# Patient Record
Sex: MALE | Race: WHITE
[De-identification: names, ages, dates, MRNs, and addresses within clinical notes are randomized per-mention and may not be internally consistent; named-entity substitution may affect disease eponyms.]

---

## 2017-11-21 ENCOUNTER — HOSPITAL ENCOUNTER (OUTPATIENT)
Dept: HOSPITAL 47 - EC | Age: 57
Setting detail: OBSERVATION
LOS: 1 days | Discharge: HOME | End: 2017-11-22
Payer: COMMERCIAL

## 2017-11-21 DIAGNOSIS — K92.2: Primary | ICD-10-CM

## 2017-11-21 DIAGNOSIS — R55: ICD-10-CM

## 2017-11-21 DIAGNOSIS — G35: ICD-10-CM

## 2017-11-21 DIAGNOSIS — R53.1: ICD-10-CM

## 2017-11-21 DIAGNOSIS — Z99.3: ICD-10-CM

## 2017-11-21 LAB
ALP SERPL-CCNC: 40 U/L (ref 38–126)
ALT SERPL-CCNC: 29 U/L (ref 21–72)
ANION GAP SERPL CALC-SCNC: 8 MMOL/L
APTT BLD: 25.5 SEC (ref 22–30)
AST SERPL-CCNC: 16 U/L (ref 17–59)
BASOPHILS # BLD AUTO: 0.1 K/UL (ref 0–0.2)
BASOPHILS NFR BLD AUTO: 1 %
BUN SERPL-SCNC: 14 MG/DL (ref 9–20)
CALCIUM SPEC-MCNC: 9.4 MG/DL (ref 8.4–10.2)
CH: 30
CHCM: 33.7
CHLORIDE SERPL-SCNC: 101 MMOL/L (ref 98–107)
CK SERPL-CCNC: 56 U/L (ref 55–170)
CO2 SERPL-SCNC: 28 MMOL/L (ref 22–30)
EOSINOPHIL # BLD AUTO: 0.1 K/UL (ref 0–0.7)
EOSINOPHIL NFR BLD AUTO: 1 %
ERYTHROCYTE [DISTWIDTH] IN BLOOD BY AUTOMATED COUNT: 5.23 M/UL (ref 4.3–5.9)
ERYTHROCYTE [DISTWIDTH] IN BLOOD: 14 % (ref 11.5–15.5)
GLUCOSE BLD-MCNC: 101 MG/DL (ref 75–99)
GLUCOSE SERPL-MCNC: 109 MG/DL (ref 74–99)
HCT VFR BLD AUTO: 46.8 % (ref 39–53)
HDW: 2.44
HGB BLD-MCNC: 15.6 GM/DL (ref 13–17.5)
INR PPP: 1.1 (ref ?–1.2)
LUC NFR BLD AUTO: 1 %
LYMPHOCYTES # SPEC AUTO: 1.6 K/UL (ref 1–4.8)
LYMPHOCYTES NFR SPEC AUTO: 17 %
MCH RBC QN AUTO: 29.8 PG (ref 25–35)
MCHC RBC AUTO-ENTMCNC: 33.3 G/DL (ref 31–37)
MCV RBC AUTO: 89.5 FL (ref 80–100)
MONOCYTES # BLD AUTO: 0.7 K/UL (ref 0–1)
MONOCYTES NFR BLD AUTO: 7 %
NEUTROPHILS # BLD AUTO: 7.2 K/UL (ref 1.3–7.7)
NEUTROPHILS NFR BLD AUTO: 73 %
NON-AFRICAN AMERICAN GFR(MDRD): >60
POTASSIUM SERPL-SCNC: 4.7 MMOL/L (ref 3.5–5.1)
PROT SERPL-MCNC: 6.6 G/DL (ref 6.3–8.2)
PT BLD: 11.1 SEC (ref 9–12)
SODIUM SERPL-SCNC: 137 MMOL/L (ref 137–145)
WBC # BLD AUTO: 0.13 10*3/UL
WBC # BLD AUTO: 9.8 K/UL (ref 3.8–10.6)
WBC (PEROX): 9.71

## 2017-11-21 PROCEDURE — 82550 ASSAY OF CK (CPK): CPT

## 2017-11-21 PROCEDURE — 99285 EMERGENCY DEPT VISIT HI MDM: CPT

## 2017-11-21 PROCEDURE — 85610 PROTHROMBIN TIME: CPT

## 2017-11-21 PROCEDURE — 85730 THROMBOPLASTIN TIME PARTIAL: CPT

## 2017-11-21 PROCEDURE — 80053 COMPREHEN METABOLIC PANEL: CPT

## 2017-11-21 PROCEDURE — 83690 ASSAY OF LIPASE: CPT

## 2017-11-21 PROCEDURE — 93005 ELECTROCARDIOGRAM TRACING: CPT

## 2017-11-21 PROCEDURE — 85025 COMPLETE CBC W/AUTO DIFF WBC: CPT

## 2017-11-21 PROCEDURE — 83036 HEMOGLOBIN GLYCOSYLATED A1C: CPT

## 2017-11-21 PROCEDURE — 84484 ASSAY OF TROPONIN QUANT: CPT

## 2017-11-21 PROCEDURE — 36415 COLL VENOUS BLD VENIPUNCTURE: CPT

## 2017-11-21 PROCEDURE — 82553 CREATINE MB FRACTION: CPT

## 2017-11-21 RX ADMIN — PANTOPRAZOLE SODIUM SCH: 40 INJECTION, POWDER, FOR SOLUTION INTRAVENOUS at 16:29

## 2017-11-21 RX ADMIN — HYDROCORTISONE SCH APPLIC: 25 CREAM TOPICAL at 23:56

## 2017-11-21 NOTE — ED
Medical Decision Making





- Medical Decision Making





57-year-old male presented for rectal bleeding.  This appears to be hemorrhoid 

bleeding families concern amount of bleeding is having and his inability to 

take care of himself secondary to MS.  Patient will be admitted for further 

evaluation.





- Lab Data


Result diagrams: 


 11/21/17 10:30





 11/21/17 10:30





 Lab Results











  11/21/17 11/21/17 11/21/17 Range/Units





  10:22 10:30 10:30 


 


WBC    9.8  (3.8-10.6)  k/uL


 


RBC    5.23  (4.30-5.90)  m/uL


 


Hgb    15.6  (13.0-17.5)  gm/dL


 


Hct    46.8  (39.0-53.0)  %


 


MCV    89.5  (80.0-100.0)  fL


 


MCH    29.8  (25.0-35.0)  pg


 


MCHC    33.3  (31.0-37.0)  g/dL


 


RDW    14.0  (11.5-15.5)  %


 


Plt Count    256  (150-450)  k/uL


 


Neutrophils %    73  %


 


Lymphocytes %    17  %


 


Monocytes %    7  %


 


Eosinophils %    1  %


 


Basophils %    1  %


 


Neutrophils #    7.2  (1.3-7.7)  k/uL


 


Lymphocytes #    1.6  (1.0-4.8)  k/uL


 


Monocytes #    0.7  (0-1.0)  k/uL


 


Eosinophils #    0.1  (0-0.7)  k/uL


 


Basophils #    0.1  (0-0.2)  k/uL


 


PT     (9.0-12.0)  sec


 


INR     (<1.2)  


 


APTT     (22.0-30.0)  sec


 


Sodium     (137-145)  mmol/L


 


Potassium     (3.5-5.1)  mmol/L


 


Chloride     ()  mmol/L


 


Carbon Dioxide     (22-30)  mmol/L


 


Anion Gap     mmol/L


 


BUN     (9-20)  mg/dL


 


Creatinine     (0.66-1.25)  mg/dL


 


Est GFR (MDRD) Af Amer     (>60 ml/min/1.73 sqM)  


 


Est GFR (MDRD) Non-Af     (>60 ml/min/1.73 sqM)  


 


Glucose     (74-99)  mg/dL


 


POC Glucose (mg/dL)  101 H    (75-99)  mg/dL


 


POC Glu Operater ID  Swapnil Martinez    


 


Calcium     (8.4-10.2)  mg/dL


 


Total Bilirubin     (0.2-1.3)  mg/dL


 


AST     (17-59)  U/L


 


ALT     (21-72)  U/L


 


Alkaline Phosphatase     ()  U/L


 


Total Creatine Kinase   56   ()  U/L


 


CK-MB (CK-2)   1.3   (0.0-2.4)  ng/mL


 


CK-MB (CK-2) Rel Index   2.3   


 


Troponin I     (0.000-0.034)  ng/mL


 


Total Protein     (6.3-8.2)  g/dL


 


Albumin     (3.5-5.0)  g/dL


 


Lipase     ()  U/L














  11/21/17 11/21/17 11/21/17 Range/Units





  10:30 10:30 10:30 


 


WBC     (3.8-10.6)  k/uL


 


RBC     (4.30-5.90)  m/uL


 


Hgb     (13.0-17.5)  gm/dL


 


Hct     (39.0-53.0)  %


 


MCV     (80.0-100.0)  fL


 


MCH     (25.0-35.0)  pg


 


MCHC     (31.0-37.0)  g/dL


 


RDW     (11.5-15.5)  %


 


Plt Count     (150-450)  k/uL


 


Neutrophils %     %


 


Lymphocytes %     %


 


Monocytes %     %


 


Eosinophils %     %


 


Basophils %     %


 


Neutrophils #     (1.3-7.7)  k/uL


 


Lymphocytes #     (1.0-4.8)  k/uL


 


Monocytes #     (0-1.0)  k/uL


 


Eosinophils #     (0-0.7)  k/uL


 


Basophils #     (0-0.2)  k/uL


 


PT   11.1   (9.0-12.0)  sec


 


INR   1.1   (<1.2)  


 


APTT   25.5   (22.0-30.0)  sec


 


Sodium  137    (137-145)  mmol/L


 


Potassium  4.7    (3.5-5.1)  mmol/L


 


Chloride  101    ()  mmol/L


 


Carbon Dioxide  28    (22-30)  mmol/L


 


Anion Gap  8    mmol/L


 


BUN  14    (9-20)  mg/dL


 


Creatinine  0.90    (0.66-1.25)  mg/dL


 


Est GFR (MDRD) Af Amer  >60    (>60 ml/min/1.73 sqM)  


 


Est GFR (MDRD) Non-Af  >60    (>60 ml/min/1.73 sqM)  


 


Glucose  109 H    (74-99)  mg/dL


 


POC Glucose (mg/dL)     (75-99)  mg/dL


 


POC Glu Operater ID     


 


Calcium  9.4    (8.4-10.2)  mg/dL


 


Total Bilirubin  0.9    (0.2-1.3)  mg/dL


 


AST  16 L    (17-59)  U/L


 


ALT  29    (21-72)  U/L


 


Alkaline Phosphatase  40    ()  U/L


 


Total Creatine Kinase     ()  U/L


 


CK-MB (CK-2)     (0.0-2.4)  ng/mL


 


CK-MB (CK-2) Rel Index     


 


Troponin I    <0.012  (0.000-0.034)  ng/mL


 


Total Protein  6.6    (6.3-8.2)  g/dL


 


Albumin  3.7    (3.5-5.0)  g/dL


 


Lipase  68    ()  U/L














Disposition


Clinical Impression: 


 Bleeding hemorrhoid, Vasovagal episode, Rectal bleeding





Disposition: ADMITTED AS IP TO THIS HOSP


Condition: Stable


Instructions:  Hemorrhoids (ED)


Additional Instructions: 


Use stool softener or fiber source to help keep stools soft.,  Use Tucks pads 

for wipes. Please return to the Emergency Department if symptoms worsen or any 

other concerns.


Prescriptions: 


Hydrocortisone [Anusol-Hc] 1 applic RECTAL BID #30 gm


Referrals: 


Matt Neville MD [Primary Care Provider] - 1-2 days

## 2017-11-21 NOTE — P.HPIM
History of Present Illness





57-year-old male was admitted to the emergency room with complaints of rectal 

bleeding.  Patient noted to have hemorrhoid.  Patient did have vasovagal 

episode with blood draw in ER.  No syncope at home.  Patient has MS legs are 

contractured patient is awaiting old chair bound.  States increasing weakness 

and contractures to arms.





Review of Systems


Constitutional: Reports weakness


Genitourinary: Reports incontinence


Musculoskeletal: Reports muscle cramps, Reports muscle weakness





Past Medical History


Additional Past Medical History / Comment(s): multiple sclerosis, hemorrhoids


History of Any Multi-Drug Resistant Organisms: None Reported


Past Surgical History: Cholecystectomy, Tonsillectomy


Past Psychological History: No Psychological Hx Reported


Smoking Status: Never smoker


Past Alcohol Use History: None Reported


Past Drug Use History: None Reported





Medications and Allergies


 Home Medications











 Medication  Instructions  Recorded  Confirmed  Type


 


Hydrocortisone [Anusol-Hc] 1 applic RECTAL BID #30 gm 11/21/17  Rx











 Allergies











Allergy/AdvReac Type Severity Reaction Status Date / Time


 


No Known Allergies Allergy   Verified 11/21/17 10:15














Physical Exam


Vitals: 


 Vital Signs











  Temp Pulse Pulse Resp BP BP Pulse Ox


 


 11/21/17 14:04  97.5 F L   109 H  16   146/87  99


 


 11/21/17 13:29   97   16  125/76   100


 


 11/21/17 12:19  98.0 F  100   20  130/71   99


 


 11/21/17 11:06   91   16  120/67   100


 


 11/21/17 10:28   71   16  120/63   98


 


 11/21/17 10:25   67   16  101/58   97


 


 11/21/17 09:43  97.5 F L  86   16  140/82   98








 Intake and Output











 11/20/17 11/21/17 11/21/17





 22:59 06:59 14:59


 


Other:   


 


  Weight   86.183 kg








 Patient Weight











 11/22/17





 06:59


 


Weight 86.183 kg














- Constitutional


General appearance: mild distress





- EENT


Eyes: PERRLA


Ears: bilateral: normal





- Neck


Neck: normal ROM





- Respiratory


Respiratory: bilateral: CTA





- Cardiovascular


Rhythm: regular





- Gastrointestinal


General gastrointestinal: soft





- Genitourinary





Almeida catheter in place





- Integumentary


Integumentary: normal





- Neurologic


Neurologic: CNII-XII intact





- Musculoskeletal





Contractures to legs wheelchair bound weakness and contractures to arms


Musculoskeletal: generalized weakness





- Psychiatric


Psychiatric: A&O x's 3, appropriate affect, intact judgment & insight





Results


CBC & Chem 7: 


 11/21/17 10:30





 11/21/17 10:30


Labs: 


 Abnormal Lab Results - Last 24 Hours (Table)











  11/21/17 11/21/17 Range/Units





  10:22 10:30 


 


Glucose   109 H  (74-99)  mg/dL


 


POC Glucose (mg/dL)  101 H   (75-99)  mg/dL


 


AST   16 L  (17-59)  U/L














Assessment and Plan


Assessment: 





Assessment


Rectal bleeding hemorrhoid


Multiple sclerosis


Weakness in arm contractures








Plan


Repeat CBC in a.m.


Consultation with surgery Dr. Jamin Olivier


Consultation with occupational therapy and physical therapy

## 2017-11-21 NOTE — ED
GI Bleed HPI





- General


Chief complaint: GI Bleed


Stated complaint: Poss GI Bleed


Time Seen by Provider: 11/21/17 09:45


Source: patient, EMS, RN notes reviewed


Mode of arrival: EMS


Limitations: physical limitation (MS)





- History of Present Illness


Initial comments: 





This is a pleasant 57-year-old male presents emergency department via EMS chief 

complaint rectal bleeding.  Patient states that he had an episode a few months 

ago while having a bowel movement.  Patient states again he had an additional 

episode yesterday while having a bowel movement and states that he had some 

bleeding in the shower today.  Patient states that he does not go the bathroom 

regular because of his MS and states that he usually needs help.  He states he 

either has to hold it or he really has to push.  Patient states that he has had 

hemorrhoids in the past.  He denies any abdominal pain denies any nausea, 

vomiting, fever, chills, chest pain or shortness breath.  Patient states that 

he does feel this is early traumatic which has not made him feel well.  Patient 

denies any dysuria or hematuria.  Patient states he does not take any current 

medications.





- Related Data


 Previous Rx's











 Medication  Instructions  Recorded


 


Hydrocortisone [Anusol-Hc] 1 applic RECTAL BID #30 gm 11/21/17











 Allergies











Allergy/AdvReac Type Severity Reaction Status Date / Time


 


No Known Allergies Allergy   Verified 11/21/17 10:15














Review of Systems


ROS Statement: 


Those systems with pertinent positive or pertinent negative responses have been 

documented in the HPI.





ROS Other: All systems not noted in ROS Statement are negative.





Past Medical History


Additional Past Medical History / Comment(s): multiple sclerosis, hemorrhoids


History of Any Multi-Drug Resistant Organisms: None Reported


Past Surgical History: Cholecystectomy, Tonsillectomy


Past Psychological History: No Psychological Hx Reported


Smoking Status: Never smoker


Past Alcohol Use History: None Reported


Past Drug Use History: None Reported





General Exam


Limitations: no limitations


General appearance: alert, in no apparent distress


Head exam: Present: atraumatic, normocephalic, normal inspection


Neck exam: Present: normal inspection, full ROM.  Absent: tenderness, 

meningismus, lymphadenopathy


Respiratory exam: Present: normal lung sounds bilaterally.  Absent: respiratory 

distress, wheezes, rales, rhonchi, stridor


Cardiovascular Exam: Present: regular rate, normal rhythm, normal heart sounds.

  Absent: systolic murmur, diastolic murmur, rubs, gallop, clicks


GI/Abdominal exam: Present: soft, normal bowel sounds.  Absent: distended, 

tenderness, guarding, rebound, rigid


Rectal exam: Present: other (Blood noted around the rectum, hemorrhoids noted)


Back exam: Absent: CVA tenderness (R), CVA tenderness (L)


Skin exam: Present: warm, dry, intact, normal color.  Absent: rash





Course


 Vital Signs











  11/21/17 11/21/17 11/21/17





  09:43 10:25 10:28


 


Temperature 97.5 F L  


 


Pulse Rate 86 67 71


 


Respiratory 16 16 16





Rate   


 


Blood Pressure 140/82 101/58 120/63


 


O2 Sat by Pulse 98 97 98





Oximetry   














  11/21/17





  11:06


 


Temperature 


 


Pulse Rate 91


 


Respiratory 16





Rate 


 


Blood Pressure 120/67


 


O2 Sat by Pulse 100





Oximetry 














- Reevaluation(s)


Reevaluation #1: 





11/21/17 11:03


1 patient was having IV place patient had vasovagal episode secondary to the 

blood and pain.  Patient's was a all and slightly diaphoretic but states he 

still much better at this time.





Medical Decision Making





- Medical Decision Making





57-year-old male present emergency from for rectal bleeding.  Patient does have 

some hemorrhoids noted with obvious blood.  Patient's hemoglobin, lab work is 

unremarkable.  Patient states she feels normal at this time he has no specific 

complaints.  Patient did have a vasovagal episode during blood draw here.  

Patient again states is having no issues.  Patient be discharged with Anusol 

advised to use fiber source and Tucks pads.





- Lab Data


Result diagrams: 


 11/21/17 10:30





 11/21/17 10:30


 Lab Results











  11/21/17 11/21/17 11/21/17 Range/Units





  10:22 10:30 10:30 


 


WBC    9.8  (3.8-10.6)  k/uL


 


RBC    5.23  (4.30-5.90)  m/uL


 


Hgb    15.6  (13.0-17.5)  gm/dL


 


Hct    46.8  (39.0-53.0)  %


 


MCV    89.5  (80.0-100.0)  fL


 


MCH    29.8  (25.0-35.0)  pg


 


MCHC    33.3  (31.0-37.0)  g/dL


 


RDW    14.0  (11.5-15.5)  %


 


Plt Count    256  (150-450)  k/uL


 


Neutrophils %    73  %


 


Lymphocytes %    17  %


 


Monocytes %    7  %


 


Eosinophils %    1  %


 


Basophils %    1  %


 


Neutrophils #    7.2  (1.3-7.7)  k/uL


 


Lymphocytes #    1.6  (1.0-4.8)  k/uL


 


Monocytes #    0.7  (0-1.0)  k/uL


 


Eosinophils #    0.1  (0-0.7)  k/uL


 


Basophils #    0.1  (0-0.2)  k/uL


 


PT     (9.0-12.0)  sec


 


INR     (<1.2)  


 


APTT     (22.0-30.0)  sec


 


Sodium     (137-145)  mmol/L


 


Potassium     (3.5-5.1)  mmol/L


 


Chloride     ()  mmol/L


 


Carbon Dioxide     (22-30)  mmol/L


 


Anion Gap     mmol/L


 


BUN     (9-20)  mg/dL


 


Creatinine     (0.66-1.25)  mg/dL


 


Est GFR (MDRD) Af Amer     (>60 ml/min/1.73 sqM)  


 


Est GFR (MDRD) Non-Af     (>60 ml/min/1.73 sqM)  


 


Glucose     (74-99)  mg/dL


 


POC Glucose (mg/dL)  101 H    (75-99)  mg/dL


 


POC Glu Operater ID  Swapnil Martinez    


 


Calcium     (8.4-10.2)  mg/dL


 


Total Bilirubin     (0.2-1.3)  mg/dL


 


AST     (17-59)  U/L


 


ALT     (21-72)  U/L


 


Alkaline Phosphatase     ()  U/L


 


Total Creatine Kinase   56   ()  U/L


 


CK-MB (CK-2)   1.3   (0.0-2.4)  ng/mL


 


CK-MB (CK-2) Rel Index   2.3   


 


Troponin I     (0.000-0.034)  ng/mL


 


Total Protein     (6.3-8.2)  g/dL


 


Albumin     (3.5-5.0)  g/dL


 


Lipase     ()  U/L














  11/21/17 11/21/17 11/21/17 Range/Units





  10:30 10:30 10:30 


 


WBC     (3.8-10.6)  k/uL


 


RBC     (4.30-5.90)  m/uL


 


Hgb     (13.0-17.5)  gm/dL


 


Hct     (39.0-53.0)  %


 


MCV     (80.0-100.0)  fL


 


MCH     (25.0-35.0)  pg


 


MCHC     (31.0-37.0)  g/dL


 


RDW     (11.5-15.5)  %


 


Plt Count     (150-450)  k/uL


 


Neutrophils %     %


 


Lymphocytes %     %


 


Monocytes %     %


 


Eosinophils %     %


 


Basophils %     %


 


Neutrophils #     (1.3-7.7)  k/uL


 


Lymphocytes #     (1.0-4.8)  k/uL


 


Monocytes #     (0-1.0)  k/uL


 


Eosinophils #     (0-0.7)  k/uL


 


Basophils #     (0-0.2)  k/uL


 


PT   11.1   (9.0-12.0)  sec


 


INR   1.1   (<1.2)  


 


APTT   25.5   (22.0-30.0)  sec


 


Sodium  137    (137-145)  mmol/L


 


Potassium  4.7    (3.5-5.1)  mmol/L


 


Chloride  101    ()  mmol/L


 


Carbon Dioxide  28    (22-30)  mmol/L


 


Anion Gap  8    mmol/L


 


BUN  14    (9-20)  mg/dL


 


Creatinine  0.90    (0.66-1.25)  mg/dL


 


Est GFR (MDRD) Af Amer  >60    (>60 ml/min/1.73 sqM)  


 


Est GFR (MDRD) Non-Af  >60    (>60 ml/min/1.73 sqM)  


 


Glucose  109 H    (74-99)  mg/dL


 


POC Glucose (mg/dL)     (75-99)  mg/dL


 


POC Glu Operater ID     


 


Calcium  9.4    (8.4-10.2)  mg/dL


 


Total Bilirubin  0.9    (0.2-1.3)  mg/dL


 


AST  16 L    (17-59)  U/L


 


ALT  29    (21-72)  U/L


 


Alkaline Phosphatase  40    ()  U/L


 


Total Creatine Kinase     ()  U/L


 


CK-MB (CK-2)     (0.0-2.4)  ng/mL


 


CK-MB (CK-2) Rel Index     


 


Troponin I    <0.012  (0.000-0.034)  ng/mL


 


Total Protein  6.6    (6.3-8.2)  g/dL


 


Albumin  3.7    (3.5-5.0)  g/dL


 


Lipase  68    ()  U/L














11/21/17 12:07


EKG performed at time: 22 normal sinus rhythm with a rate of 62  QRS 98 QT

//[id number]





Disposition


Clinical Impression: 


 Bleeding hemorrhoid, Vasovagal episode





Disposition: HOME SELF-CARE


Condition: Stable


Instructions:  Hemorrhoids (ED)


Additional Instructions: 


Use stool softener or fiber source to help keep stools soft.,  Use Tucks pads 

for wipes. Please return to the Emergency Department if symptoms worsen or any 

other concerns.


Prescriptions: 


Hydrocortisone [Anusol-Hc] 1 applic RECTAL BID #30 gm


Referrals: 


Matt Neville MD [Primary Care Provider] - 1-2 days


Time of Disposition: 12:09

## 2017-11-22 VITALS
DIASTOLIC BLOOD PRESSURE: 81 MMHG | TEMPERATURE: 99.3 F | RESPIRATION RATE: 20 BRPM | SYSTOLIC BLOOD PRESSURE: 145 MMHG | HEART RATE: 111 BPM

## 2017-11-22 RX ADMIN — HYDROCORTISONE SCH: 25 CREAM TOPICAL at 11:16

## 2017-11-22 RX ADMIN — PANTOPRAZOLE SODIUM SCH: 40 INJECTION, POWDER, FOR SOLUTION INTRAVENOUS at 09:05

## 2017-11-22 NOTE — P.DS
Providers


Date of admission: 


11/21/17 13:09





Attending physician: 


Matt Neville





Consults: 





 





11/21/17 13:15


Consult Physician Urgent 


   Consulting Provider: Polly Moser


   Consult Reason/Comments: rectal bleeding, hemorrhoids


   Do you want consulting provider notified?: Yes











Primary care physician: 


Matt Neville





[facility] Course: 


This 57-year-old gentleman being followed by Dr. Sherine Neville in the preceding 

also had a history significant multiple sclerosis.  Patient was admitted with 

the lower GI bleeding.  Surgery saw the patient and recommended outpatient 

follow-up and possible scopes in the outpatient setting.  Hemoglobin stable at 

this time.  Patient be discharged in a stable condition with guarded prognosis.





On exam vitals are stable.  Cardio S1 and S2 normal.  Respirator system clear 

to oscillation.  Abdomen soft nontender.  No system no focal weakness.





Final diagnosis


1.  Acute lower GI bleeding possibly hemorrhoids.


2.  Multiple sclerosis.


3.  Weakness multifactorial.





Patient Condition at Discharge: Stable





Plan - Discharge Summary


Discharge Rx Participant: Yes


New Discharge Prescriptions: 


New


   Hydrocortisone [Anusol-Hc] 1 applic RECTAL BID #30 gm


   Pantoprazole Sodium [Protonix] 40 mg PO DAILY #30 tablet.


Discharge Medication List





Hydrocortisone [Anusol-Hc] 1 applic RECTAL BID #30 gm 11/21/17 [Rx]


Pantoprazole Sodium [Protonix] 40 mg PO DAILY #30 tablet. 11/22/17 [Rx]








Follow up Appointment(s)/Referral(s): 


Matt Neville MD [Primary Care Provider] - 3 Days


Polly Moser MD [STAFF PHYSICIAN] - 11/28/17


[facility], [NON-STAFF] - 


Ambulatory/Diagnostic Orders: 


Complete Blood Count w/diff [LAB.AMB] Time Frame: 3 Days, Location: Determined 

By Patient


Patient Instructions/Handouts:  Hemorrhoids (ED)


Activity/Diet/Wound Care/Special Instructions: 


Use stool softener or fiber source to help keep stools soft.,  Use Tucks pads 

for wipes. Please return to the Emergency Department if symptoms worsen or any 

other concerns. Colonoscopy prep to be picked up/ RX sent to pharmacy on 24th. 

Prep sheet given for colonoscopy on 11-28.


Discharge Disposition: HOME SELF-CARE

## 2017-11-22 NOTE — P.GSCN
History of Present Illness


Consult date: 11/22/17


History of present illness: 





Patient is a 57-year-old white male with progressive multiple sclerosis.  He 

states that approximately a month ago he noted bleeding after bowel movement.  

The blood was bright red.  This did not occur again until several days ago.  He 

again noted some bleeding with a bowel movement and then after a shower he 

noted that blood per rectum.  Patient denies any pain.  The patient states that 

he has inability to ambulate he can't stand or walk on his own.  The patient 

has been eating but his bowel movements have been sluggish.  The patient has 

been diagnosed with multiple sclerosis since 2003.  At this time the patient 

has no abdominal pain on admission his hemoglobin was 15.6 and he has had no 

further rectal bleeding since admission.


Past surgical history: Cholecystectomy


Tonsillectomy


Past medical history: Multiple sclerosis


Medications: Negative


ALLERGIES: Negative


Social history:


Smoking: Negative


Alcohol: Negative


Marijuana: Negative


Review of systems:


HEENT: Negative


Lungs: Negative


Heart: Negative


GI: Constipation


: Negative





Review of Systems





- Constitutional


Constitutional Comment(s): 





Multiple sclerosis, patient is wheelchair-bound is unable to stand or ambulate


Reports as per HPI





- Cardiovascular


Reports as per HPI





- Respiratory


Reports as per HPI





- Gastrointestinal


Reports as per HPI





- Genitourinary


Reports as per HPI





- Musculoskeletal


Musculoskeleta Comment(s): 





Multiple sclerosis


Reports as per HPI





- Neurological


Reports as per HPI





- Psychiatric


Reports as per HPI





Past Medical History


Additional Past Medical History / Comment(s): multiple sclerosis, hemorrhoids


History of Any Multi-Drug Resistant Organisms: None Reported


Past Surgical History: Cholecystectomy, Tonsillectomy


Past Anesthesia/Blood Transfusion Reactions: No Reported Reaction


Past Psychological History: No Psychological Hx Reported


Smoking Status: Never smoker


Past Alcohol Use History: None Reported


Past Drug Use History: None Reported





- Past Family History


  ** Father


Additional Family Medical History / Comment(s): Father has heart disease.





  ** Mother


Family Medical History: No Reported History





Medications and Allergies


 Home Medications











 Medication  Instructions  Recorded  Confirmed  Type


 


Hydrocortisone [Anusol-Hc] 1 applic RECTAL BID #30 gm 11/21/17  Rx











 Allergies











Allergy/AdvReac Type Severity Reaction Status Date / Time


 


No Known Allergies Allergy   Verified 11/21/17 10:15














Surgical - Exam


 Vital Signs











Temp Pulse Resp BP Pulse Ox


 


 97.5 F L  86   16   140/82   98 


 


 11/21/17 09:43  11/21/17 09:43  11/21/17 09:43  11/21/17 09:43  11/21/17 09:43














- General





Minimal movement of lower extremities secondary to multiple sclerosis


no distress





- Eyes


normal ocular movement





- ENT


normal pinna, normal nares, no hearing loss





- Neck


no masses, trachea midline, no lymphadectomy, no venous distension





- Respiratory


normal respiratory effort, clear to auscultation





- Cardiovascular


Rhythm: regular


Heart Sounds: normal: S1, S2





- Abdomen


Abdomen: soft, non tender, bowel sounds





- Rectum





Decreased sphincter tone


No masses on examination


No definite external hemorrhoids identified





- Musculoskeletal





Weakness lower extremities unable to stand or walk





- Psychiatric


oriented to time, oriented to person, oriented to place, speech is normal





Results





- Labs





 11/21/17 10:30





 11/21/17 10:30


 Abnormal Lab Results - Last 24 Hours (Table)











  11/21/17 11/21/17 Range/Units





  10:22 10:30 


 


Glucose   109 H  (74-99)  mg/dL


 


POC Glucose (mg/dL)  101 H   (75-99)  mg/dL


 


AST   16 L  (17-59)  U/L








 Diabetes panel











  11/21/17 11/21/17 Range/Units





  10:30 10:30 


 


Sodium   137  (137-145)  mmol/L


 


Potassium   4.7  (3.5-5.1)  mmol/L


 


Chloride   101  ()  mmol/L


 


Carbon Dioxide   28  (22-30)  mmol/L


 


BUN   14  (9-20)  mg/dL


 


Creatinine   0.90  (0.66-1.25)  mg/dL


 


Glucose   109 H  (74-99)  mg/dL


 


Hemoglobin A1c  5.1   (4.0-6.0)  %


 


Calcium   9.4  (8.4-10.2)  mg/dL


 


AST   16 L  (17-59)  U/L


 


ALT   29  (21-72)  U/L


 


Alkaline Phosphatase   40  ()  U/L


 


Total Protein   6.6  (6.3-8.2)  g/dL


 


Albumin   3.7  (3.5-5.0)  g/dL








 Calcium panel











  11/21/17 Range/Units





  10:30 


 


Calcium  9.4  (8.4-10.2)  mg/dL


 


Albumin  3.7  (3.5-5.0)  g/dL








 Pituitary panel











  11/21/17 Range/Units





  10:30 


 


Sodium  137  (137-145)  mmol/L


 


Potassium  4.7  (3.5-5.1)  mmol/L


 


Chloride  101  ()  mmol/L


 


Carbon Dioxide  28  (22-30)  mmol/L


 


BUN  14  (9-20)  mg/dL


 


Creatinine  0.90  (0.66-1.25)  mg/dL


 


Glucose  109 H  (74-99)  mg/dL


 


Calcium  9.4  (8.4-10.2)  mg/dL








 Adrenal panel











  11/21/17 Range/Units





  10:30 


 


Sodium  137  (137-145)  mmol/L


 


Potassium  4.7  (3.5-5.1)  mmol/L


 


Chloride  101  ()  mmol/L


 


Carbon Dioxide  28  (22-30)  mmol/L


 


BUN  14  (9-20)  mg/dL


 


Creatinine  0.90  (0.66-1.25)  mg/dL


 


Glucose  109 H  (74-99)  mg/dL


 


Calcium  9.4  (8.4-10.2)  mg/dL


 


Total Bilirubin  0.9  (0.2-1.3)  mg/dL


 


AST  16 L  (17-59)  U/L


 


ALT  29  (21-72)  U/L


 


Alkaline Phosphatase  40  ()  U/L


 


Total Protein  6.6  (6.3-8.2)  g/dL


 


Albumin  3.7  (3.5-5.0)  g/dL














Assessment and Plan


Plan: 





Impression/plan:


1.  57-year-old white male with progressive multiple sclerosis and lower GI 

bleeding


2.  Questionable history of hemorrhoids


Plan:


1.  I have recommended colonoscopic evaluation with anoscopic evaluation and 

hemorrhoidectomy if this is necessary


I have had a long discussion with the patient and his wife regarding the need 

for colonic evaluation.  At this time the patient states that it is easier for 

him to undergo bowel prep at home that it would be in the hospital.  He wishes 

to be discharged home and undergo bowel prep at home and will have colonoscopy 

done an elective basis next week.  We have discussed the options of colonoscopy 

which she is very concerned about how he will handle the bowel prep, anoscopy 

after fleets enemas, or simple observation.  My recommendation is colonoscopy.  

Patient understands this at this time he has agreed to it but states that he 

wants to do the bowel prep at home.  At this time he has no active bleeding but 

he understands that if he goes home and attempts to have a bowel prep that he 

may develop bleeding again at home.  If this happens and there is concern he 

would return to the emergency room.

## 2017-11-28 ENCOUNTER — HOSPITAL ENCOUNTER (OUTPATIENT)
Dept: HOSPITAL 47 - ORWHC2ENDO | Age: 57
Discharge: HOME | End: 2017-11-28
Payer: COMMERCIAL

## 2017-11-28 VITALS — TEMPERATURE: 98.3 F

## 2017-11-28 VITALS — RESPIRATION RATE: 20 BRPM | SYSTOLIC BLOOD PRESSURE: 132 MMHG | DIASTOLIC BLOOD PRESSURE: 76 MMHG | HEART RATE: 87 BPM

## 2017-11-28 VITALS — BODY MASS INDEX: 23.7 KG/M2

## 2017-11-28 DIAGNOSIS — K57.30: Primary | ICD-10-CM

## 2017-11-28 DIAGNOSIS — G35: ICD-10-CM

## 2017-11-28 DIAGNOSIS — K64.4: ICD-10-CM

## 2017-11-28 DIAGNOSIS — Q43.8: ICD-10-CM

## 2017-11-28 DIAGNOSIS — K64.8: ICD-10-CM

## 2017-11-28 PROCEDURE — 45378 DIAGNOSTIC COLONOSCOPY: CPT

## 2017-11-28 NOTE — P.DS
Providers


Attending physician: 


Polly Moser





Primary care physician: 


Matt Neville








Plan - Discharge Summary


New Discharge Prescriptions: 


No Action


   No Known Home Medications [No Known Home Medications] 


Discharge Medication List





No Known Home Medications [No Known Home Medications]  11/27/17 [History]








Activity/Diet/Wound Care/Special Instructions: 


Be  careful not to get constipated


Discharge Disposition: HOME SELF-CARE

## 2017-11-28 NOTE — P.PCN
Date of Procedure: 11/28/17


Preoperative Diagnosis: 


Blood per rectum


Postoperative Diagnosis: 


Diverticuli, internal/with extension and external hemorrhoids


Procedure(s) Performed: 


Colonoscopy


Anesthesia: MAC


Surgeon: Polly Moser


Estimated Blood Loss (ml): 0


IV fluids (ml): 650


Pathology: none sent


Condition: stable


Disposition: PACU


Indications for Procedure: 


Blood per rectum


Operative Findings: 


Internal/external hemorrhoids, diverticuli


Description of Procedure: 


Patient was taken to the endoscopy suite and following sedation was placed in 

the left lateral decubitus position.  Rectal exam was performed.  An external 

posterior hemorrhoidal complex was identified which looked like there was some 

excoriation present.  No masses were noted.  Colonoscope was passed through the 

anus into the rectum.  Was passed into the sigmoid colon which was extremely 

tortuous and redundant.  The patient was noted to have sigmoid diverticulum.  

The scope was continued to be passed to the splenic flexure transverse colon 

hepatic flexure to the area of the cecum.  Circumferential observation of 

mucosa did not reveal any lesions of concern.  Proximally 6 minutes were taken 

to withdraw the scope from the cecum to the rectum.  No mucosal lesions of 

concern were noted in the cecum or right colon.  No mucosal lesions of concern 

in the transverse colon.  No lesions of concern in the left colon diverticulum 

as noted in the sigmoid colon scope was brought down into the rectum where it 

was retroflexed.  Internal hemorrhoidal tissue was noted.  Impression/plan:


#1 internal/external hemorrhoids


2.  Diverticuli


Plan:


1.  At this point patient has no obvious bleeding and would treat conservatively


2.  Repeat scope 7-10 years


3.  Careful not to get constipated/ control by diet

## 2020-10-16 ENCOUNTER — HOSPITAL ENCOUNTER (EMERGENCY)
Dept: HOSPITAL 47 - EC | Age: 60
Discharge: HOME | End: 2020-10-16
Payer: COMMERCIAL

## 2020-10-16 VITALS
DIASTOLIC BLOOD PRESSURE: 64 MMHG | SYSTOLIC BLOOD PRESSURE: 129 MMHG | RESPIRATION RATE: 16 BRPM | TEMPERATURE: 100.2 F | HEART RATE: 96 BPM

## 2020-10-16 DIAGNOSIS — Z96.0: ICD-10-CM

## 2020-10-16 DIAGNOSIS — N39.0: Primary | ICD-10-CM

## 2020-10-16 DIAGNOSIS — Z90.49: ICD-10-CM

## 2020-10-16 DIAGNOSIS — R50.9: ICD-10-CM

## 2020-10-16 DIAGNOSIS — Z20.828: ICD-10-CM

## 2020-10-16 LAB
ALBUMIN SERPL-MCNC: 3.4 G/DL (ref 3.5–5)
ALP SERPL-CCNC: 48 U/L (ref 38–126)
ALT SERPL-CCNC: 30 U/L (ref 4–49)
ANION GAP SERPL CALC-SCNC: 7 MMOL/L
AST SERPL-CCNC: 31 U/L (ref 17–59)
BASOPHILS # BLD AUTO: 0 K/UL (ref 0–0.2)
BASOPHILS NFR BLD AUTO: 0 %
BUN SERPL-SCNC: 10 MG/DL (ref 9–20)
CALCIUM SPEC-MCNC: 8.6 MG/DL (ref 8.4–10.2)
CHLORIDE SERPL-SCNC: 95 MMOL/L (ref 98–107)
CO2 SERPL-SCNC: 31 MMOL/L (ref 22–30)
EOSINOPHIL # BLD AUTO: 0.1 K/UL (ref 0–0.7)
EOSINOPHIL NFR BLD AUTO: 1 %
ERYTHROCYTE [DISTWIDTH] IN BLOOD BY AUTOMATED COUNT: 4.65 M/UL (ref 4.3–5.9)
ERYTHROCYTE [DISTWIDTH] IN BLOOD: 13 % (ref 11.5–15.5)
GLUCOSE SERPL-MCNC: 134 MG/DL (ref 74–99)
HCT VFR BLD AUTO: 44.3 % (ref 39–53)
HGB BLD-MCNC: 14.5 GM/DL (ref 13–17.5)
LYMPHOCYTES # SPEC AUTO: 0.9 K/UL (ref 1–4.8)
LYMPHOCYTES NFR SPEC AUTO: 8 %
MCH RBC QN AUTO: 31.3 PG (ref 25–35)
MCHC RBC AUTO-ENTMCNC: 32.8 G/DL (ref 31–37)
MCV RBC AUTO: 95.3 FL (ref 80–100)
MONOCYTES # BLD AUTO: 0.7 K/UL (ref 0–1)
MONOCYTES NFR BLD AUTO: 6 %
NEUTROPHILS # BLD AUTO: 10.3 K/UL (ref 1.3–7.7)
NEUTROPHILS NFR BLD AUTO: 85 %
PH UR: 7 [PH] (ref 5–8)
PLATELET # BLD AUTO: 360 K/UL (ref 150–450)
POTASSIUM SERPL-SCNC: 4.2 MMOL/L (ref 3.5–5.1)
PROT SERPL-MCNC: 6.6 G/DL (ref 6.3–8.2)
RBC UR QL: 6 /HPF (ref 0–5)
SODIUM SERPL-SCNC: 133 MMOL/L (ref 137–145)
SP GR UR: 1.01 (ref 1–1.03)
SQUAMOUS UR QL AUTO: <1 /HPF (ref 0–4)
UROBILINOGEN UR QL STRIP: 4 MG/DL (ref ?–2)
WBC # BLD AUTO: 12.2 K/UL (ref 3.8–10.6)

## 2020-10-16 PROCEDURE — 36415 COLL VENOUS BLD VENIPUNCTURE: CPT

## 2020-10-16 PROCEDURE — 85025 COMPLETE CBC W/AUTO DIFF WBC: CPT

## 2020-10-16 PROCEDURE — 99283 EMERGENCY DEPT VISIT LOW MDM: CPT

## 2020-10-16 PROCEDURE — 87040 BLOOD CULTURE FOR BACTERIA: CPT

## 2020-10-16 PROCEDURE — 96374 THER/PROPH/DIAG INJ IV PUSH: CPT

## 2020-10-16 PROCEDURE — 96361 HYDRATE IV INFUSION ADD-ON: CPT

## 2020-10-16 PROCEDURE — 87502 INFLUENZA DNA AMP PROBE: CPT

## 2020-10-16 PROCEDURE — 80053 COMPREHEN METABOLIC PANEL: CPT

## 2020-10-16 PROCEDURE — 83605 ASSAY OF LACTIC ACID: CPT

## 2020-10-16 PROCEDURE — 81001 URINALYSIS AUTO W/SCOPE: CPT

## 2020-10-16 NOTE — ED
General Adult HPI





- General


Chief complaint: Upper Respiratory Infection


Stated complaint: Diarrhea


Time Seen by Provider: 10/16/20 17:50


Source: patient, family


Mode of arrival: wheelchair


Limitations: physical limitation





- History of Present Illness


Initial comments: 





Patient is a 60-year-old male with history of multiple sclerosis who presents 

emergency Department with reported weakness in upper respiratory symptoms.  

Patient states that one week ago he began having nasal congestion, sinus 

pressure and nonproductive cough.  He is taking over-the-counter medications for

symptoms.  Reports that they began to improve until today when he found himself 

so weak and lightheaded that he decided to come into the emergency room for 

evaluation.  Upon presentation here was found of the patient's fever.  He does 

have an indwelling Hitchcock and called his doctor who recommended he come into the 

emergency department for evaluation of UTI.  States he's had the Hitchcock in place 

for 2 years and has yet to have a urinary tract infection.  Patient does not 

take any medications for his MS.  States that the cough and congestion have 

entirely improved at this time.  No sick contacts at Covid symptoms.  Denies 

chest pain.  No abdominal pain.  Does admit to possible red discoloration to his

urine in his Hitchcock bag.  Also admits to 2 episodes of explosive diarrhea.  

Denies melenic stools or hematochezia.  No recent antibiotic use.  No other 

alleviating, precipitating or modifying factors





- Related Data


                                  Previous Rx's











 Medication  Instructions  Recorded


 


Ciprofloxacin HCl [Cipro] 500 mg PO BID 1 Days #14 tab 10/16/20











                                    Allergies











Allergy/AdvReac Type Severity Reaction Status Date / Time


 


No Known Allergies Allergy   Verified 10/16/20 20:07














Review of Systems


ROS Statement: 


Those systems with pertinent positive or pertinent negative responses have been 

documented in the HPI.





ROS Other: All systems not noted in ROS Statement are negative.





Past Medical History


Past Medical History: GI Bleed


Additional Past Medical History / Comment(s): multiple sclerosis, uses a lift 

with straps to transfer to chair. , Hospitalized at [facility] 11/21/17-11/22/17 for 

rectal bleeding, difficulty urinating after discharge and has indwelling hitchcock 

catheter.


History of Any Multi-Drug Resistant Organisms: None Reported


Past Surgical History: Cholecystectomy, Tonsillectomy


Past Anesthesia/Blood Transfusion Reactions: No Reported Reaction


Past Psychological History: No Psychological Hx Reported


Smoking Status: Never smoker


Past Alcohol Use History: None Reported


Past Drug Use History: None Reported





- Past Family History


  ** Father


Additional Family Medical History / Comment(s): Father has heart disease.





  ** Mother


Family Medical History: No Reported History





General Exam


Limitations: physical limitation


General appearance: alert, in no apparent distress


Head exam: Present: atraumatic, normocephalic, normal inspection


Eye exam: Present: normal appearance, PERRL, EOMI.  Absent: scleral icterus, 

conjunctival injection, periorbital swelling


ENT exam: Present: normal exam, mucous membranes moist


Neck exam: Present: normal inspection.  Absent: tenderness, meningismus, 

lymphadenopathy


Respiratory exam: Present: normal lung sounds bilaterally.  Absent: respiratory 

distress, wheezes, rales, rhonchi, stridor


Cardiovascular Exam: Present: regular rate, normal rhythm, normal heart sounds. 

 Absent: systolic murmur, diastolic murmur, rubs, gallop, clicks


GI/Abdominal exam: Present: soft, normal bowel sounds.  Absent: distended, 

tenderness, guarding, rebound, rigid


Extremities exam: Present: normal inspection, full ROM, normal capillary refill.

  Absent: tenderness, pedal edema, joint swelling, calf tenderness


Back exam: Present: normal inspection


Neurological exam: Present: alert, oriented X3, CN II-XII intact


Psychiatric exam: Present: normal affect, normal mood


Skin exam: Present: warm, dry, intact, normal color.  Absent: rash





Course


                                   Vital Signs











  10/16/20 10/16/20 10/16/20





  17:46 21:00 21:24


 


Temperature 101.3 F H 100.2 F H 100.2 F H


 


Pulse Rate 116 H  96


 


Respiratory 22  16





Rate   


 


Blood Pressure 147/76  129/64


 


O2 Sat by Pulse 92 L  96





Oximetry   














Medical Decision Making





- Medical Decision Making





Upon return of the patient's placed into room 4.  A thorough history and 

physical exam was performed.  I did recommend chest x-ray, covid and influenza 

swabs as well as laboratory studies.  Patient does refuse a chest x-ray stating 

that it is too hard for him and maneuver from his chair to the bed.  He also 

denies a cough or shortness of breath.  Peripheral IV is established and the 

patient was given a liter bolus of normal saline.  Patient was given 1 g of 

Tylenol.  Laboratory studies demonstrate a white count of 12.2.  Urinalysis is 

positive for moderate bacteria.  Patient will be given a dose of Rocephin.  P

atient then does agree to Covid testing.  He requests discharge at this time.  

Patient will be given a prescription for Cipro.  He must take the medications as

 directed and follow his primary care doctor within 2-4 days.  The patient has 

any new or worsening symptoms to include worsening fever, weakness, inability to

 void the patient must return to the emergency room.  The patient understood.  

He is given written and verbal discharge instructions and discharged home in 

stable condition





- Lab Data


Result diagrams: 


                                 10/16/20 19:40





                                 10/16/20 19:40


                                   Lab Results











  10/16/20 10/16/20 10/16/20 Range/Units





  19:40 19:40 19:40 


 


WBC  12.2 H    (3.8-10.6)  k/uL


 


RBC  4.65    (4.30-5.90)  m/uL


 


Hgb  14.5    (13.0-17.5)  gm/dL


 


Hct  44.3    (39.0-53.0)  %


 


MCV  95.3    (80.0-100.0)  fL


 


MCH  31.3    (25.0-35.0)  pg


 


MCHC  32.8    (31.0-37.0)  g/dL


 


RDW  13.0    (11.5-15.5)  %


 


Plt Count  360    (150-450)  k/uL


 


Neutrophils %  85    %


 


Lymphocytes %  8    %


 


Monocytes %  6    %


 


Eosinophils %  1    %


 


Basophils %  0    %


 


Neutrophils #  10.3 H    (1.3-7.7)  k/uL


 


Lymphocytes #  0.9 L    (1.0-4.8)  k/uL


 


Monocytes #  0.7    (0-1.0)  k/uL


 


Eosinophils #  0.1    (0-0.7)  k/uL


 


Basophils #  0.0    (0-0.2)  k/uL


 


Sodium    133 L  (137-145)  mmol/L


 


Potassium    4.2  (3.5-5.1)  mmol/L


 


Chloride    95 L  ()  mmol/L


 


Carbon Dioxide    31 H  (22-30)  mmol/L


 


Anion Gap    7  mmol/L


 


BUN    10  (9-20)  mg/dL


 


Creatinine    0.47 L  (0.66-1.25)  mg/dL


 


Est GFR (CKD-EPI)AfAm    >90  (>60 ml/min/1.73 sqM)  


 


Est GFR (CKD-EPI)NonAf    >90  (>60 ml/min/1.73 sqM)  


 


Glucose    134 H  (74-99)  mg/dL


 


Plasma Lactic Acid Bryant   1.2   (0.7-2.0)  mmol/L


 


Calcium    8.6  (8.4-10.2)  mg/dL


 


Total Bilirubin    0.7  (0.2-1.3)  mg/dL


 


AST    31  (17-59)  U/L


 


ALT    30  (4-49)  U/L


 


Alkaline Phosphatase    48  ()  U/L


 


Total Protein    6.6  (6.3-8.2)  g/dL


 


Albumin    3.4 L  (3.5-5.0)  g/dL


 


Urine Color     


 


Urine Appearance     (Clear)  


 


Urine pH     (5.0-8.0)  


 


Ur Specific Gravity     (1.001-1.035)  


 


Urine Protein     (Negative)  


 


Urine Glucose (UA)     (Negative)  


 


Urine Ketones     (Negative)  


 


Urine Blood     (Negative)  


 


Urine Nitrite     (Negative)  


 


Urine Bilirubin     (Negative)  


 


Urine Urobilinogen     (<2.0)  mg/dL


 


Ur Leukocyte Esterase     (Negative)  


 


Urine RBC     (0-5)  /hpf


 


Ur Squamous Epith Cells     (0-4)  /hpf


 


Urine Bacteria     (None)  /hpf


 


Urine Mucus     (None)  /hpf


 


Urine Yeast (Budding)     (None)  /hpf


 


Coronavirus (PCR)     (Not Detected)  


 


Influenza Type A RNA     (Not Detectd)  


 


Influenza Type B (PCR)     (Not Detectd)  














  10/16/20 10/16/20 10/16/20 Range/Units





  20:00 21:59 21:59 


 


WBC     (3.8-10.6)  k/uL


 


RBC     (4.30-5.90)  m/uL


 


Hgb     (13.0-17.5)  gm/dL


 


Hct     (39.0-53.0)  %


 


MCV     (80.0-100.0)  fL


 


MCH     (25.0-35.0)  pg


 


MCHC     (31.0-37.0)  g/dL


 


RDW     (11.5-15.5)  %


 


Plt Count     (150-450)  k/uL


 


Neutrophils %     %


 


Lymphocytes %     %


 


Monocytes %     %


 


Eosinophils %     %


 


Basophils %     %


 


Neutrophils #     (1.3-7.7)  k/uL


 


Lymphocytes #     (1.0-4.8)  k/uL


 


Monocytes #     (0-1.0)  k/uL


 


Eosinophils #     (0-0.7)  k/uL


 


Basophils #     (0-0.2)  k/uL


 


Sodium     (137-145)  mmol/L


 


Potassium     (3.5-5.1)  mmol/L


 


Chloride     ()  mmol/L


 


Carbon Dioxide     (22-30)  mmol/L


 


Anion Gap     mmol/L


 


BUN     (9-20)  mg/dL


 


Creatinine     (0.66-1.25)  mg/dL


 


Est GFR (CKD-EPI)AfAm     (>60 ml/min/1.73 sqM)  


 


Est GFR (CKD-EPI)NonAf     (>60 ml/min/1.73 sqM)  


 


Glucose     (74-99)  mg/dL


 


Plasma Lactic Acid Bryant     (0.7-2.0)  mmol/L


 


Calcium     (8.4-10.2)  mg/dL


 


Total Bilirubin     (0.2-1.3)  mg/dL


 


AST     (17-59)  U/L


 


ALT     (4-49)  U/L


 


Alkaline Phosphatase     ()  U/L


 


Total Protein     (6.3-8.2)  g/dL


 


Albumin     (3.5-5.0)  g/dL


 


Urine Color  Yellow    


 


Urine Appearance  Cloudy    (Clear)  


 


Urine pH  7.0    (5.0-8.0)  


 


Ur Specific Gravity  1.011    (1.001-1.035)  


 


Urine Protein  Negative    (Negative)  


 


Urine Glucose (UA)  Negative    (Negative)  


 


Urine Ketones  Negative    (Negative)  


 


Urine Blood  Small H    (Negative)  


 


Urine Nitrite  Negative    (Negative)  


 


Urine Bilirubin  Negative    (Negative)  


 


Urine Urobilinogen  4.0    (<2.0)  mg/dL


 


Ur Leukocyte Esterase  Trace H    (Negative)  


 


Urine RBC  6 H    (0-5)  /hpf


 


Ur Squamous Epith Cells  <1    (0-4)  /hpf


 


Urine Bacteria  Moderate H    (None)  /hpf


 


Urine Mucus  Rare H    (None)  /hpf


 


Urine Yeast (Budding)  Few H    (None)  /hpf


 


Coronavirus (PCR)    Not Detected  (Not Detected)  


 


Influenza Type A RNA   Not Detected   (Not Detectd)  


 


Influenza Type B (PCR)   Not Detected   (Not Detectd)  














Disposition


Clinical Impression: 


 Pyrexia, UTI (urinary tract infection), Indwelling catheter present on 

admission





Disposition: HOME SELF-CARE


Condition: Stable


Instructions (If sedation given, give patient instructions):  Urinary Tract 

Infection in Women (ED)


Additional Instructions: 


Please follow-up with your primary care doctor.  Return to the emergency room f

or any new or worsening symptoms


Prescriptions: 


Ciprofloxacin HCl [Cipro] 500 mg PO BID 1 Days #14 tab


Is patient prescribed a controlled substance at d/c from ED?: No


Referrals: 


Matt Neville MD [Primary Care Provider] - 1-2 days


Time of Disposition: 21:03

## 2022-05-17 ENCOUNTER — HOSPITAL ENCOUNTER (EMERGENCY)
Dept: HOSPITAL 47 - EC | Age: 62
Discharge: HOME | End: 2022-05-17
Payer: COMMERCIAL

## 2022-05-17 VITALS
TEMPERATURE: 97.7 F | DIASTOLIC BLOOD PRESSURE: 81 MMHG | SYSTOLIC BLOOD PRESSURE: 143 MMHG | RESPIRATION RATE: 16 BRPM | HEART RATE: 93 BPM

## 2022-05-17 DIAGNOSIS — T83.091A: Primary | ICD-10-CM

## 2022-05-17 PROCEDURE — 51702 INSERT TEMP BLADDER CATH: CPT

## 2022-05-17 PROCEDURE — 99283 EMERGENCY DEPT VISIT LOW MDM: CPT

## 2022-05-17 NOTE — ED
Male Urogenital HPI





- General


Chief complaint: Urogenital


Stated complaint: catheter blocked


Time Seen by Provider: 05/17/22 02:37


Source: family


Mode of arrival: wheelchair


Limitations: no limitations





- History of Present Illness


Initial comments: 





This patient is a 62-year-old man with history of MS who presents with complaint

that he feels like his catheter has stopped draining.  Patient states for the 

past few hours there has not seemed to be any urine accumulating in the catheter

and he is now developing suprapubic pain and has urge to urinate.  No fever or 

chills.  No flank pains.


MD Complaint: other


-: hour(s)


Location: abdomen


Radiation: none


Severity: moderate


Quality: dull


Consistency: constant


Improves with: none


Worsens with: none


Reports: urinary retention





- Related Data


                                  Previous Rx's











 Medication  Instructions  Recorded


 


Ciprofloxacin HCl [Cipro] 500 mg PO BID 1 Days #14 tab 10/16/20











                                    Allergies











Allergy/AdvReac Type Severity Reaction Status Date / Time


 


No Known Allergies Allergy   Verified 05/17/22 02:21














Review of Systems


ROS Statement: 


Those systems with pertinent positive or pertinent negative responses have been 

documented in the HPI.





ROS Other: All systems not noted in ROS Statement are negative.


Constitutional: Denies: fever, chills


Respiratory: Denies: cough, dyspnea


Cardiovascular: Denies: chest pain


Gastrointestinal: Reports: as per HPI, abdominal pain.  Denies: nausea, 

vomiting, diarrhea


Genitourinary: Reports: as per HPI.  Denies: testicular pain, testicular mass


Musculoskeletal: Denies: back pain


Skin: Denies: rash


Neurological: Denies: headache





Past Medical History


Past Medical History: GI Bleed


Additional Past Medical History / Comment(s): multiple sclerosis, uses a lift 

with straps to transfer to chair. , Hospitalized at [facility] 11/21/17-11/22/17 for 

rectal bleeding, difficulty urinating after discharge and has indwelling hitchcock 

catheter.


History of Any Multi-Drug Resistant Organisms: None Reported


Past Surgical History: Cholecystectomy, Tonsillectomy


Past Anesthesia/Blood Transfusion Reactions: No Reported Reaction


Past Psychological History: No Psychological Hx Reported


Smoking Status: Never smoker


Past Alcohol Use History: None Reported


Past Drug Use History: None Reported





- Past Family History


  ** Father


Additional Family Medical History / Comment(s): Father has heart disease.





  ** Mother


Family Medical History: No Reported History





General Exam


General appearance: alert, in no apparent distress


Head exam: Present: atraumatic


Eye exam: Present: normal appearance


GI/Abdominal exam: Present: soft.  Absent: distended, tenderness, guarding, 

rebound, rigid, mass


Skin exam: Present: warm, dry, intact, normal color.  Absent: rash





Course


                                   Vital Signs











  05/17/22





  02:16


 


Temperature 97.7 F


 


Pulse Rate 93


 


Respiratory 16





Rate 


 


Blood Pressure 143/81


 


O2 Sat by Pulse 94 L





Oximetry 














Medical Decision Making





- Medical Decision Making





Patient is 62-year-old man who presents with symptoms of Hitchcock catheter 

obstruction and urinary retention.  The patient did have a Hitchcock catheter 

changed by nursing personnel prior to my being able to get into the room due to 

being tied up with an ambulance case.  The catheter was changed and the patient 

did have moderate amount of urine that was passed the patient's symptoms had 

resolved.  Discussed appropriate further care and follow-up





Disposition


Clinical Impression: 


 Hitchcock catheter problem





Disposition: HOME SELF-CARE


Condition: Good


Instructions (If sedation given, give patient instructions):  Hitchcock Catheter 

Placement and Care (ED)


Is patient prescribed a controlled substance at d/c from ED?: No


Referrals: 


Matt Neville MD [Primary Care Provider] - 1-2 days

## 2024-12-20 NOTE — P.GSHP
History of Present Illness


H&P Date: 12/20/24





65 yo  male with advanced MS leading to immobility and a chronic indwelling 

catheter.  The patient has been having problems with his cahteter plugging as 

well as irrigation issues.  I did a cysto in the office and found 3 bladder 

stones.  He thus comes for cysto with cystolithotripsy to the bladder stones.





Past Medical History


Past Medical History: GI Bleed


Additional Past Medical History / Comment(s): multiple sclerosis, bladder s

tones, indwelling chronic catheter


History of Any Multi-Drug Resistant Organisms: None Reported


Past Surgical History: Cholecystectomy, Tonsillectomy


Additional Past Surgical History / Comment(s): Colonscopy


Past Anesthesia/Blood Transfusion Reactions: No Reported Reaction


Smoking Status: Never smoker





- Past Family History


  ** Father


Additional Family Medical History / Comment(s): Father has heart disease.





  ** Mother


Family Medical History: No Reported History





Medications and Allergies


                                Home Medications











 Medication  Instructions  Recorded  Confirmed  Type


 


No Known Home Medications  12/19/24 12/19/24 History








                                    Allergies











Allergy/AdvReac Type Severity Reaction Status Date / Time


 


No Known Allergies Allergy   Verified 12/19/24 15:09














Surgical - Exam





- General


well developed, well nourished, no distress, chronically ill





- Eyes


normal ocular movement, no icteric





- ENT


no hearing loss, no congestion





- Neck


no masses, trachea midline





- Respiratory


normal respiratory effort, clear to auscultation





- Abdomen


Abdomen: soft, non tender, no guarding, no rigid, no rebound





- Genitourinary





indwelling catheter/





- Integumentary


no rash, no abnormal pigmentation





- Neurologic





wheel chair bound due to advanced MS


no disoriented, no combative





- Psychiatric


oriented to time, oriented to person, oriented to place, speech is normal, 

memory intact





Assessment and Plan


Assessment: 





Impression:  NGB secondary to MS, chronic indwelling catheter.  Bladder stones


Plan: cysto with laser lithotripsy to bladder stones.

## 2024-12-23 NOTE — XR
EXAMINATION TYPE: XR KUB

 

DATE OF EXAM: 12/23/2024 11:50 AM

 

COMPARISON: 8/19/2015

 

CLINICAL INDICATION: Male, 64 years old with history of stone placement; [facility]

 

TECHNIQUE: One radiographic view of the abdomen was obtained.

 

FINDINGS: The bowel gas pattern is nonspecific without dilated loops of small or large bowel. . Fecal
 material and gas are demonstrated throughout the colon and rectum. There is no evidence for organome
palomo or pneumoperitoneum.  The osseous structures are intact. Rotated exam with renal stone not defin
itively visualized. Bladder stones project over the expected location of the urinary bladder. Almeida c
atheter in place with renal stones measuring up to 39 and 33 mm

 

IMPRESSION:

 

1.  Bladder stones in the bladder lumen projecting over the pelvis.

2.  No renal stones definitively visualized. 

3.  Nonspecific bowel gas pattern without radiographic evidence for acute process.

 

[facility], Workstation: [id number], 12/23/2024 11:57 AM

## 2024-12-23 NOTE — P.OP
Date of Procedure: 12/23/24


Preoperative Diagnosis: 


neurogenic bladder with bladder stones


Postoperative Diagnosis: 


same


Procedure(s) Performed: 


cystoscopy with laser lithotripsy to large bladder stones (greater than 5 cm)


Anesthesia: AIDA


Surgeon: Adam Britt


Estimated Blood Loss (ml): 50


Pathology: other (bladder stones)


Condition: stable


Disposition: PACU


Indications for Procedure: 


patient is 64.  He has advanced multiple sclerosis.  He wears a chronic 

indwelling catheter.  He's been having problems with catheter change and 

irrigation.  Cystoscopy in the office identified several bladder stones.  He 

comes for cystoscopy lithotripsy


Description of Procedure: 


patient brought operating suite.  Given a general anesthetic.  Placed in a 

modified lithotomy position.  Cystoscopy Foroblique lens and 21-Chilean sheath 

identifies a normal urethra.  Prostate is not obstructing.  There are several 

bladder stones 5 with a total diameter greater than 5 cm.  With 1000  laser 

probe and 30 W of energy the stones are broken into multiple pieces and 

irrigated out of the bladder.  At the end of the procedure other than bladder 

edema there is no remaining stones.  An 18-Chilean coud-tip catheter is 

introduced in the bladder.  The patient is awake and returned recovery in good 

condition.  He'll be discharged home upon recovery and found the office in 2 

weeks.